# Patient Record
Sex: MALE | Race: AMERICAN INDIAN OR ALASKA NATIVE
[De-identification: names, ages, dates, MRNs, and addresses within clinical notes are randomized per-mention and may not be internally consistent; named-entity substitution may affect disease eponyms.]

---

## 2020-08-14 ENCOUNTER — HOSPITAL ENCOUNTER (INPATIENT)
Dept: HOSPITAL 5 - ED | Age: 50
LOS: 2 days | Discharge: HOME | DRG: 66 | End: 2020-08-16
Attending: INTERNAL MEDICINE | Admitting: INTERNAL MEDICINE
Payer: COMMERCIAL

## 2020-08-14 DIAGNOSIS — E78.5: ICD-10-CM

## 2020-08-14 DIAGNOSIS — Z82.49: ICD-10-CM

## 2020-08-14 DIAGNOSIS — R20.0: ICD-10-CM

## 2020-08-14 DIAGNOSIS — I16.0: ICD-10-CM

## 2020-08-14 DIAGNOSIS — R29.701: ICD-10-CM

## 2020-08-14 DIAGNOSIS — I10: ICD-10-CM

## 2020-08-14 DIAGNOSIS — I63.9: Primary | ICD-10-CM

## 2020-08-14 LAB
APTT BLD: 25.9 SEC. (ref 24.2–36.6)
BASOPHILS # (AUTO): 0 K/MM3 (ref 0–0.1)
BASOPHILS NFR BLD AUTO: 0.8 % (ref 0–1.8)
BUN SERPL-MCNC: 12 MG/DL (ref 9–20)
BUN/CREAT SERPL: 10 %
CALCIUM SERPL-MCNC: 9.2 MG/DL (ref 8.4–10.2)
EOSINOPHIL # BLD AUTO: 0 K/MM3 (ref 0–0.4)
EOSINOPHIL NFR BLD AUTO: 1.1 % (ref 0–4.3)
HCT VFR BLD CALC: 39.6 % (ref 35.5–45.6)
HEMOLYSIS INDEX: 7
HGB BLD-MCNC: 13.4 GM/DL (ref 11.8–15.2)
INR PPP: 1.07 (ref 0.87–1.13)
LYMPHOCYTES # BLD AUTO: 1.6 K/MM3 (ref 1.2–5.4)
LYMPHOCYTES NFR BLD AUTO: 38.8 % (ref 13.4–35)
MCHC RBC AUTO-ENTMCNC: 34 % (ref 32–34)
MCV RBC AUTO: 91 FL (ref 84–94)
MONOCYTES # (AUTO): 0.4 K/MM3 (ref 0–0.8)
MONOCYTES % (AUTO): 9 % (ref 0–7.3)
PLATELET # BLD: 151 K/MM3 (ref 140–440)
RBC # BLD AUTO: 4.34 M/MM3 (ref 3.65–5.03)

## 2020-08-14 PROCEDURE — 36415 COLL VENOUS BLD VENIPUNCTURE: CPT

## 2020-08-14 PROCEDURE — 85730 THROMBOPLASTIN TIME PARTIAL: CPT

## 2020-08-14 PROCEDURE — 84484 ASSAY OF TROPONIN QUANT: CPT

## 2020-08-14 PROCEDURE — 82962 GLUCOSE BLOOD TEST: CPT

## 2020-08-14 PROCEDURE — 80053 COMPREHEN METABOLIC PANEL: CPT

## 2020-08-14 PROCEDURE — 93005 ELECTROCARDIOGRAM TRACING: CPT

## 2020-08-14 PROCEDURE — 70450 CT HEAD/BRAIN W/O DYE: CPT

## 2020-08-14 PROCEDURE — 70551 MRI BRAIN STEM W/O DYE: CPT

## 2020-08-14 PROCEDURE — 85670 THROMBIN TIME PLASMA: CPT

## 2020-08-14 PROCEDURE — 80061 LIPID PANEL: CPT

## 2020-08-14 PROCEDURE — 85025 COMPLETE CBC W/AUTO DIFF WBC: CPT

## 2020-08-14 PROCEDURE — 93306 TTE W/DOPPLER COMPLETE: CPT

## 2020-08-14 PROCEDURE — 85610 PROTHROMBIN TIME: CPT

## 2020-08-14 PROCEDURE — 83036 HEMOGLOBIN GLYCOSYLATED A1C: CPT

## 2020-08-14 PROCEDURE — 80048 BASIC METABOLIC PNL TOTAL CA: CPT

## 2020-08-14 PROCEDURE — G0378 HOSPITAL OBSERVATION PER HR: HCPCS

## 2020-08-14 NOTE — CONSULTATION
History of Present Illness


Consult date: 08/14/20





Medications and Allergies


                                    Allergies











Allergy/AdvReac Type Severity Reaction Status Date / Time


 


No Known Allergies Allergy   Unverified 08/14/20 17:22














Physical Examination





- Vital Signs


Vital Signs: 


                                   Vital Signs











Temp Pulse Resp BP Pulse Ox


 


 98.4 F   97 H  18   204/117   99 


 


 08/14/20 17:22  08/14/20 17:22  08/14/20 17:22  08/14/20 17:22  08/14/20 17:22














Results





- Laboratory Findings


CBC and BMP: 


                                 08/14/20 17:50





Abnormal Lab Findings: 


                                  Abnormal Labs











  08/14/20 08/14/20





  17:36 17:50


 


WBC   4.2 L


 


RDW   13.0 L


 


Lymph % (Auto)   38.8 H


 


Mono % (Auto)   9.0 H


 


POC Glucose  115 H 














Assessment and Plan





[facility]


[facility]








Date of Service:   08/14/2020 17:26:35





Impression:


      stroke





Comments/Sign-Out:


50 year old male patient right limb numbness since last evening. 





NIHSS 0.


 BPs were 200+/100 


No indication for IVtPA, 


Ok for routine stroke admission, 


no indication for IVtPA or stat interventional assessment. 


BPs less than 180/90 


gentle IV hydration NS IVF 60mls/h 


Asa, statin





Metrics:


Last Known Well: Unknown


[facility] Notification Time: 08/14/2020 17:26:35


Arrival Time: 08/14/2020 17:26:00


Stamp Time: 08/14/2020 17:26:35


Time First Login Attempt: 08/14/2020 17:27:39


Video Start Time: 08/14/2020 17:27:39





Symptoms: right leg numbness progressing to arm since last evening


NIHSS Start Assessment Time: 08/14/2020 17:36:58


Patient is not a candidate for tPA.


Video End Time: 08/14/2020 17:50:17





CT head showed no acute hemorrhage or acute core infarct.





Clinical Presentation is not Suggestive of Large Vessel Occlusive Disease





Radiologist was not called back for review of advanced imaging because CTa not 

obtained


ED Physician notified of diagnostic impression and management plan on 08/14/2020

18:09:19





Sign Out:


      Discussed with Emergency Department Provider











------------------------------------------------------------------------------





History of Present Illness:


Patient is a 50 year old Male.





50 year old male patient who noted right leg numbness yesterday, at some point 

progressing to the right arm . Symptoms were still present today so he was 

brought here after going to urgent care. He denies limb weakness, denies vision 

changes. NIHSS 0


 





Examination:


BP(207/117),


1A: Level of Consciousness - Alert; keenly responsive + 0


1B: Ask Month and Age - Both Questions Right + 0


1C: Blink Eyes & Squeeze Hands - Performs Both Tasks + 0


2: Test Horizontal Extraocular Movements - Normal + 0


3: Test Visual Fields - No Visual Loss + 0


4: Test Facial Palsy (Use Grimace if Obtunded) - Normal symmetry + 0


5A: Test Left Arm Motor Drift - No Drift for 10 Seconds + 0


5B: Test Right Arm Motor Drift - No Drift for 10 Seconds + 0


6A: Test Left Leg Motor Drift - No Drift for 5 Seconds + 0


6B: Test Right Leg Motor Drift - No Drift for 5 Seconds + 0


7: Test Limb Ataxia (FNF/Heel-Shin) - No Ataxia + 0


8: Test Sensation - Normal; No sensory loss + 0


9: Test Language/Aphasia - Normal; No aphasia + 0


10: Test Dysarthria - Normal + 0


11: Test Extinction/Inattention - No abnormality + 0





NIHSS Score: 0





Patient/Family was informed the Neurology Consult would happen via TeleHealth 

consult by way of interactive audio and video telecommunications and consented 

to receiving care in this manner.





Due to the immediate potential for life-threatening deterioration due to 

underlying acute neurologic illness, I spent 35 minutes providing critical care.

This time includes time for face to face visit via telemedicine, review of 

medical records, imaging studies and discussion of findings with providers, the 

patient and/or family.








Dr Harsha Solis








[facility]


[phone]





Case [id number]

## 2020-08-14 NOTE — EMERGENCY DEPARTMENT REPORT
ED Neuro Deficit HPI





- General


Chief Complaint: Neuro Symptoms/Deficit


Stated Complaint: RT SIDE NUMB


Time Seen by Provider: 08/14/20 17:39


Source: patient


Mode of arrival: Ambulatory


Limitations: No Limitations





- History of Present Illness


Initial Comments: 





This is a 50-year-old male with history of hypertension who presents with right 

face right arm right leg numbness since 3:00 this morning.  Numbness began in 

the right foot then progressed to the right leg right arm and face throughout 

the day.  Numbness is still present.  He currently denies headache.  He denies 

weakness.  He denies blurry vision.


-: Sudden, This morning


Location: right face, right arm, right leg


History of same: No


Place: home


Severity: mild


Improves With: none


Worsens With: none


On Anticoagulants: No


Context: sudden onset


Associated Symptoms: denies other symptoms





- Related Data


Allergies/Adverse Reactions: 


                                    Allergies











Allergy/AdvReac Type Severity Reaction Status Date / Time


 


No Known Allergies Allergy   Unverified 08/14/20 17:22














ED Review of Systems


ROS: 


Stated complaint: RT SIDE NUMB


Other details as noted in HPI





Comment: All other systems reviewed and negative


Constitutional: denies: fever, malaise


Respiratory: denies: cough, shortness of breath


Cardiovascular: denies: chest pain


Gastrointestinal: denies: nausea, vomiting


Neurological: numbness.  denies: headache, paresthesias, confusion, abnormal 

gait, vertigo





ED Past Medical Hx





- Past Medical History


Previous Medical History?: Yes


Hx Hypertension: Yes





- Surgical History


Past Surgical History?: No





- Family History


Family history: hypertension





- Social History


Smoking Status: Never Smoker


Substance Use Type: Alcohol





ED Neuro Physical Exam





- General


Limitations: No Limitations


General appearance: alert, in no apparent distress


Suspected Stroke: Yes





- Head


Head exam: Present: atraumatic, normocephalic





- Eye


Eye exam: Present: normal appearance





- ENT


ENT exam: Present: mucous membranes moist





- Neck


Neck exam: Present: normal inspection, full ROM





- Respiratory


Respiratory exam: Present: normal lung sounds bilaterally.  Absent: respiratory 

distress, wheezes, rales, rhonchi





- Cardiovascular


Cardiovascular Exam: Present: regular rate, normal rhythm, normal heart sounds. 

Absent: systolic murmur, diastolic murmur, rubs, gallop





- GI/Abdominal


GI/Abdominal exam: Present: soft, normal bowel sounds.  Absent: distended, 

tenderness, guarding, rebound





- Rectal


Rectal exam: Present: deferred





- Extremities Exam


Extremities exam: Present: normal inspection





- Back Exam


Back exam: Present: normal inspection





- Neurological Exam


Neurological exam: Present: alert, oriented X3





- NIHSS


Assessment Interval: Baseline


1a. Level of Consciousness: alert/keenly responsive


1b. LOC Questions: answers both correctly


1c. LOC Commands: performs tasks correctly


2. Best Gaze: normal


3. Visual: no visual loss


4. Facial Palsy: normal symmetrical movement


5b. Motor Arm Right: no drift


5a. Motor Arm Left: no drift


6a. Motor Leg Left: no drift


6b. Motor Leg Right: no drift


7. Limb Ataxia: absent


8. Sensory: mild/moderate sensory loss


9. Best Language: no aphasia


10. Dysarthria: normal


11. Extinction/Inattention: no abnormality


Total Score: 1


Stroke Severity: Minor Stroke





- Psychiatric


Psychiatric exam: Present: normal affect, normal mood





- Skin


Skin exam: Present: warm, dry, intact, normal color.  Absent: rash





ED Course


                                   Vital Signs











  08/14/20





  17:22


 


Temperature 98.4 F


 


Pulse Rate 97 H


 


Respiratory 18





Rate 


 


Blood Pressure 204/117





[Right] 


 


O2 Sat by Pulse 99





Oximetry 














- Lab Data


Result diagrams: 


                                 08/14/20 17:50





                                 08/14/20 17:50


                                   Lab Results











  08/14/20 08/14/20 08/14/20 Range/Units





  17:36 17:50 17:50 


 


WBC   4.2 L   (4.5-11.0)  K/mm3


 


RBC   4.34   (3.65-5.03)  M/mm3


 


Hgb   13.4   (11.8-15.2)  gm/dl


 


Hct   39.6   (35.5-45.6)  %


 


MCV   91   (84-94)  fl


 


MCH   31   (28-32)  pg


 


MCHC   34   (32-34)  %


 


RDW   13.0 L   (13.2-15.2)  %


 


Plt Count   151   (140-440)  K/mm3


 


Lymph % (Auto)   38.8 H   (13.4-35.0)  %


 


Mono % (Auto)   9.0 H   (0.0-7.3)  %


 


Eos % (Auto)   1.1   (0.0-4.3)  %


 


Baso % (Auto)   0.8   (0.0-1.8)  %


 


Lymph #   1.6   (1.2-5.4)  K/mm3


 


Mono #   0.4   (0.0-0.8)  K/mm3


 


Eos #   0.0   (0.0-0.4)  K/mm3


 


Baso #   0.0   (0.0-0.1)  K/mm3


 


Seg Neutrophils %   50.3   (40.0-70.0)  %


 


Seg Neutrophils #   2.1   (1.8-7.7)  K/mm3


 


PT    14.0  (12.2-14.9)  Sec.


 


INR    1.07  (0.87-1.13)  


 


APTT    25.9  (24.2-36.6)  Sec.


 


Thrombin Time    17.1  (15.1-19.6)  Sec.


 


Sodium     (137-145)  mmol/L


 


Potassium     (3.6-5.0)  mmol/L


 


Chloride     ()  mmol/L


 


Carbon Dioxide     (22-30)  mmol/L


 


Anion Gap     mmol/L


 


BUN     (9-20)  mg/dL


 


Creatinine     (0.8-1.3)  mg/dL


 


Estimated GFR     ml/min


 


BUN/Creatinine Ratio     %


 


Glucose     ()  mg/dL


 


POC Glucose  115 H    ()  


 


Calcium     (8.4-10.2)  mg/dL


 


Troponin T     (0.00-0.029)  ng/mL














  08/14/20 08/14/20 Range/Units





  17:50 18:39 


 


WBC    (4.5-11.0)  K/mm3


 


RBC    (3.65-5.03)  M/mm3


 


Hgb    (11.8-15.2)  gm/dl


 


Hct    (35.5-45.6)  %


 


MCV    (84-94)  fl


 


MCH    (28-32)  pg


 


MCHC    (32-34)  %


 


RDW    (13.2-15.2)  %


 


Plt Count    (140-440)  K/mm3


 


Lymph % (Auto)    (13.4-35.0)  %


 


Mono % (Auto)    (0.0-7.3)  %


 


Eos % (Auto)    (0.0-4.3)  %


 


Baso % (Auto)    (0.0-1.8)  %


 


Lymph #    (1.2-5.4)  K/mm3


 


Mono #    (0.0-0.8)  K/mm3


 


Eos #    (0.0-0.4)  K/mm3


 


Baso #    (0.0-0.1)  K/mm3


 


Seg Neutrophils %    (40.0-70.0)  %


 


Seg Neutrophils #    (1.8-7.7)  K/mm3


 


PT    (12.2-14.9)  Sec.


 


INR    (0.87-1.13)  


 


APTT    (24.2-36.6)  Sec.


 


Thrombin Time    (15.1-19.6)  Sec.


 


Sodium  143   (137-145)  mmol/L


 


Potassium  3.7   (3.6-5.0)  mmol/L


 


Chloride  105.8   ()  mmol/L


 


Carbon Dioxide  26   (22-30)  mmol/L


 


Anion Gap  15   mmol/L


 


BUN  12   (9-20)  mg/dL


 


Creatinine  1.2   (0.8-1.3)  mg/dL


 


Estimated GFR  > 60   ml/min


 


BUN/Creatinine Ratio  10   %


 


Glucose  139 H   ()  mg/dL


 


POC Glucose   122 H  ()  


 


Calcium  9.2   (8.4-10.2)  mg/dL


 


Troponin T  < 0.010   (0.00-0.029)  ng/mL














08/14/20 19:00


EKG obtained 1852


Normal sinus rhythm rate 80 bpm normal axis normal intervals no ST elevation 

positive LVH nonischemic T wave pattern





- Radiology Data


Radiology results: report reviewed





NONENHANCED CT SCAN OF THE BRAIN: 





INDICATION: 


neuro deficits <6hrs or sx present upon awakening. 





TECHNIQUE: Routine CT head without contrast. Sagittal and coronal reformatted 

images were obtained.


 All CT scans at this location are performed using CT dose reduction for ALARA 

by means of automated 


 exposure control. 





COMPARISON: 


None. 





FINDINGS: 





BRAIN / INTRACRANIAL CONTENTS: 





Hemorrhage:No intracranial hemorrhage; no subarachnoid hemorrhage 





Stroke mimics: No subdural or epidural hematoma or space taking lesion 





Acute/subacute territorial infarction: 


Gray-white matter interface: No blurring; normal 


Insular cortex: Normal 


Basal ganglia: Normal 


Wedge shaped parenchymal low density area: Not present 


Cortical sulci: Not effaced 





Lacunar infarctions: Lacunae seen in both basal ganglia but these appear to be 

chronic (attenuation


 similar to that of CSF) 





Vasculopathy: 


Dense middle cerebral artery sign: Not present 


Internal carotid artery terminus: Normal 


Basilar artery:Normal 


Middle cerebral artery branches in the sylvian fissure (Dot sign): Normal 


Calcified embolus: Not present 





ASPECT score: 10 





Chronic lesions: Encephalomalacia is seen in the right superior frontal gyrus. 

Small chronic 


 lacunae are seen in the basal ganglia bilaterally. 





Craniocervical junction:No significant abnormality 


Orbits:No significant abnormality 


Paranasal sinuses/mastoids:No significant abnormality 





Additional findings: None 





IMPRESSION: 





No intracerebral hemorrhage 





No stroke mimics 





No acute/subacute infarction: Given the history of right arm and leg weakness, 

left precentral 


 gyrus and left corticospinal tract appear normal. 





- Medical Decision Making





Acute CVA: Patient has persistent right-sided numbness to both soft touch and 

pinprick.  NIH stroke score 1.  Due to mild symptoms, thrombolytics not 

indicated.  Also due to time of onset, thrombolytics are not indicated.  Patient

 given aspirin in the emergency department.  Tele-neurologist recommended 

admission for stroke evaluation.  I have addressed elevated blood pressure with 

IV labetalol.





\Patient is admitted to the hospital service





Work-up notable for encephalomalacia right superior frontal gyrus, lacunar 

infarcts chronic in both basal ganglia





- Thrombolytic Inclusion/Exclusion


Thrombolytic Exclusion Criteria: Symptom Onset > 3 Hours


Thrombolytic Contraindications: Rapidily Improving s/s


Critical Care Time: Yes


Critical care time in (mins) excluding proc time.: 40


Critical care attestation.: 


If time is entered above; I have spent that time in minutes in the direct care 

of this critically ill patient, excluding procedure time.


40 minutes of critical care time excluding procedures were used in the care of 

the patient. I reviewed electronic record.  I discussed treatment plan with the 

nursing team members at the bedside.  I came immediately to the bedside upon 

patient's return to the treatment room..  I Patient required multiple 

interventions and reassessments.  I spoke with multiple consultants including 

tele-neurologist, radiologist and hospitalist regarding patient's care.  Upon 

patient's arrival, code stroke initiated.





ED Disposition


Clinical Impression: 


 Acute CVA (cerebrovascular accident), Hypertensive urgency





Disposition: DC-09 OP ADMIT IP TO THIS HOSP


Is pt being admited?: Yes


Does the pt Need Aspirin: No


Condition: Stable


Referrals: 


PRIMARY CARE,MD [Primary Care Provider] - 3-5 Days

## 2020-08-14 NOTE — CAT SCAN REPORT
NONENHANCED CT SCAN OF THE BRAIN:



INDICATION:

neuro deficits <6hrs or sx present upon awakening.



TECHNIQUE: Routine CT head without contrast. Sagittal and coronal reformatted images were obtained. A
ll CT scans at this location are performed using CT dose reduction for ALARA by means of automated ex
posure control.



COMPARISON: 

None.



FINDINGS:



BRAIN / INTRACRANIAL CONTENTS:



Hemorrhage:No intracranial hemorrhage; no subarachnoid hemorrhage



Stroke mimics: No subdural or epidural hematoma or space taking lesion



Acute/subacute territorial infarction:

   Gray-white matter interface: No blurring; normal

          Insular cortex: Normal

          Basal ganglia: Normal

   Wedge shaped parenchymal low density area: Not present

   Cortical sulci: Not effaced



Lacunar infarctions: Lacunae seen in both basal ganglia but these appear to be chronic (attenuation s
imilar to that of CSF)



Vasculopathy:

   Dense middle cerebral artery sign: Not present

   Internal carotid artery terminus: Normal

   Basilar artery:Normal

   Middle cerebral artery branches in the sylvian fissure (Dot sign): Normal

   Calcified embolus: Not present



ASPECT score: 10



Chronic lesions: Encephalomalacia is seen in the right superior frontal gyrus. Small chronic lacunae 
are seen in the basal ganglia bilaterally.



Craniocervical junction:No significant abnormality

Orbits:No significant abnormality

Paranasal sinuses/mastoids:No significant abnormality



Additional findings: None



IMPRESSION:



No intracerebral hemorrhage



No stroke mimics



No acute/subacute infarction: Given the history of right arm and leg weakness, left precentral gyrus 
and left corticospinal tract appear normal.





This exam was performed as part of a code stroke protocol. The exam was completed at [facility] on 8/14/2020 4:39 PM. The exam was reviewed at 4:43 PM Central daylight saving time 
and ER physician was notified at 4:47 PM.









Signer Name: Arden Coronado MD 

Signed: 8/14/2020 5:48 PM

Workstation Name: [id number]

## 2020-08-15 RX ADMIN — HYDRALAZINE HYDROCHLORIDE PRN MG: 20 INJECTION INTRAMUSCULAR; INTRAVENOUS at 12:21

## 2020-08-15 RX ADMIN — ASPIRIN SCH MG: 325 TABLET ORAL at 11:00

## 2020-08-15 RX ADMIN — FAMOTIDINE SCH MG: 20 TABLET ORAL at 21:26

## 2020-08-15 RX ADMIN — Medication SCH ML: at 11:02

## 2020-08-15 RX ADMIN — Medication SCH ML: at 02:43

## 2020-08-15 RX ADMIN — FAMOTIDINE SCH MG: 20 TABLET ORAL at 11:00

## 2020-08-15 RX ADMIN — Medication SCH ML: at 21:28

## 2020-08-15 NOTE — PROGRESS NOTE
Assessment and Plan





- Patient Problems


(1) Acute CVA (cerebrovascular accident)


Current Visit: Yes   Status: Acute   





(2) Hypertensive urgency


Current Visit: Yes   Status: Acute   





Subjective


Date of service: 08/15/20





Objective





- Constitutional


Vitals: 


                               Vital Signs - 12hr











  08/15/20 08/15/20 08/15/20





  12:14 17:07 20:44


 


Temperature 98.4 F 98.3 F 98.4 F


 


Pulse Rate 79 97 H 81


 


Respiratory 18 18 18





Rate   


 


Blood Pressure 174/113 166/87 147/97


 


O2 Sat by Pulse 97 99 100





Oximetry   











General appearance: Present: no acute distress, well-nourished





- EENT


Eyes: PERRL, EOM intact


ENT: hearing intact, clear oral mucosa


Ears: bilateral: normal





- Neck


Neck: supple, normal ROM





- Respiratory


Respiratory effort: normal


Respiratory: bilateral: CTA





- Breasts


Breasts: normal





- Cardiovascular


Rhythm: regular


Heart Sounds: Present: S1 & S2.  Absent: gallop, rub


Extremities: pulses intact, No edema, normal color, Full ROM





- Gastrointestinal


General gastrointestinal: Present: soft, non-tender, non-distended, normal bowel

sounds





- Genitourinary


Male genitourinary: normal





- Integumentary


Integumentary: clear, warm, dry





- Musculoskeletal


Musculoskeletal: 1, strength equal bilaterally





- Neurologic


Neurologic: moves all extremities





- Psychiatric


Psychiatric: memory intact, appropriate mood/affect, intact judgment & insight





- Labs


CBC & Chem 7: 


                                 08/14/20 17:50





                                 08/14/20 17:50


Labs: 


IMPRESSION: 1. There is a 1 cm acute infarct oriented along the posterior limb 

of the left internal capsule as detailed above. 2. There is otherwise moderate 

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 





HEART Score





- HEART Score


Troponin: 


                                        











Troponin T  < 0.010 ng/mL (0.00-0.029)   08/14/20  17:50

## 2020-08-15 NOTE — MAGNETIC RESONANCE REPORT
MR brain wo con



INDICATION / CLINICAL INFORMATION:

50 years Male; MAIN. 



TECHNIQUE: 

Multiplanar, multisequence MR images of the brain were obtained. 



COMPARISON: 

The study is compared to the previous CT of 8/14/2020.



FINDINGS:



BRAIN / INTRACRANIAL CONTENTS: There is an acute infarct oriented along the fissure limb of the left 
internal capsule measuring 1.0 cm longitudinally. On the FLAIR sequence, there are otherwise scattere
d hyperintense foci involving cerebral white matter most consistent with microvascular angiopathy. Th
ere are old small lacunar infarcts involving the basal ganglia bilaterally. Chronic ischemic changes 
also extend to the frontal cortical region. The diffusion imaging reveals no further evidence of acut
e infarction.



On the susceptibility weighted imaging, there are a few foci of marked decreased signal intensity inc
luding the basal ganglia most consistent with chronic microhemorrhages. There is mild cerebral atroph
y. The ventricular system is correspondingly appropriate in size and configuration. No extra-axial fl
uid collections or significant mass effect is identified. The above microvascular angiopathic changes
 would be advanced for the patient's age.



CRANIOCERVICAL JUNCTION: No significant abnormality.

VASCULAR FLOW-VOIDS: The distal internal carotid arteries and vertebrobasilar system also demonstrate
 appropriate signal voids.



ORBITS: No significant abnormality of visualized orbits.

SINUSES / MASTOIDS: There are mild focal inflammatory changes along the posterior left maxillary sinu
s.



ADDITIONAL FINDINGS: None. 



IMPRESSION:

1. There is a 1 cm acute infarct oriented along the posterior limb of the left internal capsule as de
tailed above.

2. 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



Signer Name: Siddharth Skinner MD 

Signed: 8/15/2020 11:04 AM

Workstation Name: [id number]

## 2020-08-15 NOTE — HISTORY AND PHYSICAL REPORT
History of Present Illness


Date of examination: 08/14/20


Date of admission: 


08/14/20 19:47





Chief complaint: 





Right-sided numbness since a.m.


History of present illness: 


50-year-old male with history of hypertension comes in for right-sided numbness 

since 3 AM this morning.  No seizures.  Right right-sided numbness extended from

the foot to the left and right lower extremity and then to left side of the body

and face.  Patient has problems when he tries to close his mouth








- Past Medical History


Previous Medical History?: Yes


Hx Hypertension: Yes





- Surgical History


Past Surgical History?: No





- Family History


Family history: hypertension





- Social History


Smoking Status: Never Smoker


Substance Use Type: Alcohol





Review of systems


Constitutional no weight loss or weight gain no fever or chills


HEENT no sore throat no post nasal drip no diplopia


Neck no neck stiffness no lymph gland enlargement


Chest and lungs no shortness of breath cough or wheezing


CVS no chest pain no diaphoresis no palpitations


GI no nausea no vomiting no diarrhea


Genitourinary system no dysuria no flank pain


Musculoskeletal system no muscle pains no joint pains


CNS right-sided numbness from the face to the right side of the chest to the 

right legs right lower extremity.


Skin no rash no itching


Psychiatric no depression no homicidal or suicidal tendencies


Hematologic no lymphedema or bruising


Endocrine no polydipsia no polyuria no cold intolerance no heat intolerance





Medications and Allergies


                                    Allergies











Allergy/AdvReac Type Severity Reaction Status Date / Time


 


No Known Allergies Allergy   Verified 08/15/20 00:20











                                Home Medications











 Medication  Instructions  Recorded  Confirmed  Last Taken  Type


 


No Known Home Medications [No  08/14/20 08/14/20 Unknown History





Reported Home Medications]     














Exam





- Constitutional


Vitals: 


                                        











Temp Pulse Resp BP Pulse Ox


 


 98.4 F   65   12   168/107   98 


 


 08/14/20 17:22  08/14/20 23:00  08/14/20 23:00  08/14/20 23:00  08/14/20 23:00











General appearance: Present: no acute distress, well-nourished





- EENT


Eyes: Present: PERRL


ENT: hearing intact, clear oral mucosa





- Neck


Neck: Present: supple, normal ROM





- Respiratory


Respiratory effort: normal


Respiratory: bilateral: CTA





- Cardiovascular


Heart rate: 78


Rhythm: regular


Heart Sounds: Present: S1 & S2.  Absent: rub, click





- Extremities


Extremities: no ischemia, pulses intact, pulses symmetrical, No edema


Peripheral Pulses: within normal limits





- Abdominal


General gastrointestinal: Present: soft, non-tender, non-distended, normal bowel

sounds


Male genitourinary: Present: normal





- Integumentary


Integumentary: Present: clear, warm, dry





- Musculoskeletal


Musculoskeletal: gait normal, strength equal bilaterally





- Psychiatric


Psychiatric: appropriate mood/affect, intact judgment & insight





- Neurologic


Neurologic: CNII-XII intact, moves all extremities





- Allied Health


Allied health notes reviewed: nursing, case management





HEART Score





- HEART Score


Troponin: 


                                        











Troponin T  < 0.010 ng/mL (0.00-0.029)   08/14/20  17:50    














Results





- Labs


CBC & Chem 7: 


                                 08/16/20 04:47





                                 08/16/20 04:47


Labs: 


                             Laboratory Last Values











WBC  4.2 K/mm3 (4.5-11.0)  L  08/14/20  17:50    


 


RBC  4.34 M/mm3 (3.65-5.03)   08/14/20  17:50    


 


Hgb  13.4 gm/dl (11.8-15.2)   08/14/20  17:50    


 


Hct  39.6 % (35.5-45.6)   08/14/20  17:50    


 


MCV  91 fl (84-94)   08/14/20  17:50    


 


MCH  31 pg (28-32)   08/14/20  17:50    


 


MCHC  34 % (32-34)   08/14/20  17:50    


 


RDW  13.0 % (13.2-15.2)  L  08/14/20  17:50    


 


Plt Count  151 K/mm3 (140-440)   08/14/20  17:50    


 


Lymph % (Auto)  38.8 % (13.4-35.0)  H  08/14/20  17:50    


 


Mono % (Auto)  9.0 % (0.0-7.3)  H  08/14/20  17:50    


 


Eos % (Auto)  1.1 % (0.0-4.3)   08/14/20  17:50    


 


Baso % (Auto)  0.8 % (0.0-1.8)   08/14/20  17:50    


 


Lymph #  1.6 K/mm3 (1.2-5.4)   08/14/20  17:50    


 


Mono #  0.4 K/mm3 (0.0-0.8)   08/14/20  17:50    


 


Eos #  0.0 K/mm3 (0.0-0.4)   08/14/20  17:50    


 


Baso #  0.0 K/mm3 (0.0-0.1)   08/14/20  17:50    


 


Seg Neutrophils %  50.3 % (40.0-70.0)   08/14/20  17:50    


 


Seg Neutrophils #  2.1 K/mm3 (1.8-7.7)   08/14/20  17:50    


 


PT  14.0 Sec. (12.2-14.9)   08/14/20  17:50    


 


INR  1.07  (0.87-1.13)   08/14/20  17:50    


 


APTT  25.9 Sec. (24.2-36.6)   08/14/20  17:50    


 


Thrombin Time  17.1 Sec. (15.1-19.6)   08/14/20  17:50    


 


Sodium  143 mmol/L (137-145)   08/14/20  17:50    


 


Potassium  3.7 mmol/L (3.6-5.0)   08/14/20  17:50    


 


Chloride  105.8 mmol/L ()   08/14/20  17:50    


 


Carbon Dioxide  26 mmol/L (22-30)   08/14/20  17:50    


 


Anion Gap  15 mmol/L  08/14/20  17:50    


 


BUN  12 mg/dL (9-20)   08/14/20  17:50    


 


Creatinine  1.2 mg/dL (0.8-1.3)   08/14/20  17:50    


 


Estimated GFR  > 60 ml/min  08/14/20  17:50    


 


BUN/Creatinine Ratio  10 %  08/14/20  17:50    


 


Glucose  139 mg/dL ()  H  08/14/20  17:50    


 


POC Glucose  122  ()  H  08/14/20  18:39    


 


Calcium  9.2 mg/dL (8.4-10.2)   08/14/20  17:50    


 


Troponin T  < 0.010 ng/mL (0.00-0.029)   08/14/20  17:50    








                                    Short CBC











  08/16/20 Range/Units





  04:47 


 


WBC  3.7 L  (4.5-11.0)  K/mm3


 


Hgb  14.2  (11.8-15.2)  gm/dl


 


Hct  42.1  (35.5-45.6)  %


 


Plt Count  159  (140-440)  K/mm3








                                       BMP











  08/16/20





  04:47


 


Sodium  138


 


Potassium  3.5 L


 


Chloride  101.7


 


Carbon Dioxide  27


 


BUN  15


 


Creatinine  1.1


 


Glucose  105 H


 


Calcium  9.2








                                 Liver Function











  08/16/20 Range/Units





  04:47 


 


Total Bilirubin  0.50  (0.1-1.2)  mg/dL


 


AST  19  (5-40)  units/L


 


ALT  23  (7-56)  units/L


 


Alkaline Phosphatase  46  ()  units/L


 


Albumin  4.0  (3.9-5)  g/dL














- Imaging and Cardiology


EKG: report reviewed


Chest x-ray: report reviewed (If people like you do not go for putting the end 

of)


Imaging and Cardiology: 


MRI w





IMPRESSION: 1. There is a 1 cm acute infarct oriented along the posterior limb 

of the left internal capsule as detailed above. 2. There is otherwise moderate 

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


Cheema/IV: 


                                        





Voiding Method                   Urinal


IV Catheter Type [Right          INT / Saline Lock


Antecubital]                     











Assessment and Plan


Advance Directives: Yes





- Patient Problems


(1) Acute CVA (cerebrovascular accident)


Current Visit: Yes   Status: Acute   


Plan to address problem: 


Acute infarct continue left internal capsule area affecting the sensory region 

patient has sensory stroke


Aspirin and Plavix











(2) Hypertensive urgency


Current Visit: Yes   Status: Acute   


Plan to address problem: 


Patient initiated on antihypertensives and IV hydralazine PRN








(3) DVT prophylaxis


Current Visit: Yes   Status: Acute   


Plan to address problem: 


On Lovenox and GI prophylaxis

## 2020-08-16 VITALS — SYSTOLIC BLOOD PRESSURE: 147 MMHG | DIASTOLIC BLOOD PRESSURE: 95 MMHG

## 2020-08-16 LAB
ALBUMIN SERPL-MCNC: 4 G/DL (ref 3.9–5)
ALT SERPL-CCNC: 23 UNITS/L (ref 7–56)
BASOPHILS # (AUTO): 0 K/MM3 (ref 0–0.1)
BASOPHILS NFR BLD AUTO: 0.6 % (ref 0–1.8)
BUN SERPL-MCNC: 15 MG/DL (ref 9–20)
BUN/CREAT SERPL: 14 %
CALCIUM SERPL-MCNC: 9.2 MG/DL (ref 8.4–10.2)
EOSINOPHIL # BLD AUTO: 0.1 K/MM3 (ref 0–0.4)
EOSINOPHIL NFR BLD AUTO: 2.6 % (ref 0–4.3)
HCT VFR BLD CALC: 42.1 % (ref 35.5–45.6)
HDLC SERPL-MCNC: 65 MG/DL (ref 40–59)
HEMOLYSIS INDEX: 18
HGB BLD-MCNC: 14.2 GM/DL (ref 11.8–15.2)
LYMPHOCYTES # BLD AUTO: 1.7 K/MM3 (ref 1.2–5.4)
LYMPHOCYTES NFR BLD AUTO: 45.8 % (ref 13.4–35)
MCHC RBC AUTO-ENTMCNC: 34 % (ref 32–34)
MCV RBC AUTO: 92 FL (ref 84–94)
MONOCYTES # (AUTO): 0.4 K/MM3 (ref 0–0.8)
MONOCYTES % (AUTO): 10.7 % (ref 0–7.3)
PLATELET # BLD: 159 K/MM3 (ref 140–440)
RBC # BLD AUTO: 4.59 M/MM3 (ref 3.65–5.03)

## 2020-08-16 RX ADMIN — ASPIRIN SCH MG: 325 TABLET ORAL at 10:36

## 2020-08-16 RX ADMIN — FAMOTIDINE SCH MG: 20 TABLET ORAL at 21:12

## 2020-08-16 RX ADMIN — FAMOTIDINE SCH MG: 20 TABLET ORAL at 10:36

## 2020-08-16 RX ADMIN — HYDRALAZINE HYDROCHLORIDE PRN MG: 20 INJECTION INTRAMUSCULAR; INTRAVENOUS at 20:31

## 2020-08-16 RX ADMIN — Medication SCH ML: at 10:36

## 2020-08-16 RX ADMIN — Medication SCH ML: at 21:14

## 2020-08-16 NOTE — PROGRESS NOTE
Assessment and Plan





- Patient Problems


(1) Acute CVA (cerebrovascular accident)


Current Visit: Yes   Status: Acute   


Plan to address problem: 


Acute infarct continue left internal capsule area affecting the sensory region 

patient has sensory stroke


Aspirin and Plavix











(2) Hypertensive urgency


Current Visit: Yes   Status: Acute   


Plan to address problem: 


Patient initiated on antihypertensives and IV hydralazine PRN








(3) DVT prophylaxis


Current Visit: Yes   Status: Acute   


Plan to address problem: 


On Lovenox and GI prophylaxis








Subjective


Date of service: 08/15/20


Principal diagnosis: Acute CVA


Interval history: 


Patient has right-sided numbness from face to the toes which is improving 

patient's clinical picture consistent with acute sensory stroke involving the 

possibly thalamus and internal capsule











Objective





- Constitutional


General appearance: Present: no acute distress, well-nourished





- EENT


Eyes: PERRL, EOM intact


ENT: hearing intact, clear oral mucosa


Ears: bilateral: normal





- Neck


Neck: supple, normal ROM





- Respiratory


Respiratory effort: normal


Respiratory: bilateral: CTA





- Breasts


Breasts: normal





- Cardiovascular


Heart rate: 78


Rhythm: regular


Heart Sounds: Present: S1 & S2.  Absent: gallop, rub


Extremities: no ischemia, pulses intact, No edema, normal color, Full ROM





- Gastrointestinal


General gastrointestinal: Present: soft, non-tender, non-distended, normal bowel

sounds





- Genitourinary


Male genitourinary: deferred, normal





- Integumentary


Integumentary: clear, warm, dry





- Musculoskeletal


Musculoskeletal: 1, strength equal bilaterally





- Neurologic


Neurologic: moves all extremities





- Psychiatric


Psychiatric: memory intact, appropriate mood/affect, intact judgment & insight





- Labs


CBC & Chem 7: 


                                 08/16/20 04:47





                                 08/16/20 04:47


Labs: 


                              Abnormal lab results











  08/16/20 08/16/20 Range/Units





  04:47 04:47 


 


WBC  3.7 L   (4.5-11.0)  K/mm3


 


RDW  13.1 L   (13.2-15.2)  %


 


Lymph % (Auto)  45.8 H   (13.4-35.0)  %


 


Mono % (Auto)  10.7 H   (0.0-7.3)  %


 


Seg Neutrophils #  1.5 L   (1.8-7.7)  K/mm3


 


Potassium   3.5 L  (3.6-5.0)  mmol/L


 


Glucose   105 H  ()  mg/dL


 


Cholesterol   200 H  ()  mg/dL


 


HDL Cholesterol   65 H  (40-59)  mg/dL














HEART Score





- HEART Score


Troponin: 


                                        











Troponin T  < 0.010 ng/mL (0.00-0.029)   08/14/20  17:50

## 2020-08-16 NOTE — DISCHARGE SUMMARY
Providers





- Providers


Date of Admission: 


08/15/20 11:00





Date of discharge: 08/16/20


Attending physician: 


CADEN LONGORIA





                                        





08/15/20 00:14


Occupational Therapy Evaluate and Treat [CONS] Routine 


   Comment: 


   Reason For Exam: Neuro deficits


Physical Therapy Evaluation and Treat [CONS] Routine 


   Comment: 


   Reason For Exam: Neuro deficits











Primary care physician: 


PRIMARY CARE MD








Hospitalization


Condition: Stable


Pertinent studies: 


Patient had MRI and echocardiogram and carotid duplex scan





Hospital course: 


Subjective


Date of service: 08/16/20


Principal diagnosis: Acute CVA


Interval history: 


Patient has right-sided numbness from face to the toes which is improving 

patient's clinical picture consistent with acute sensory stroke involving the 

possibly thalamus and internal capsule














(1) Acute CVA (cerebrovascular accident)


Current Visit: Yes   Status: Acute   


Plan to address problem: 


Acute infarct continue left internal capsule area affecting the sensory region 

patient has sensory stroke


Aspirin and Plavix











(2) Hypertensive urgency 


Current Visit: Yes   Status: Acute   


Plan to address problem: 


Patient initiated on antihypertensives and IV hydralazine PRN








(3) DVT prophylaxis


Current Visit: Yes   Status: Acute   


Plan to address problem: 


On Lovenox and GI prophylaxis 





4)Dyslipidemia 


on statins  











Disposition: DC-01 TO HOME OR SELFCARE





- Discharge Diagnoses


(1) Acute CVA (cerebrovascular accident)


Status: Acute   





(2) Hypertensive urgency


Status: Acute   





(3) DVT prophylaxis


Status: Acute   





Core Measure Documentation





- Palliative Care


Palliative Care/ Comfort Measures: Not Applicable





- Core Measures


Any of the following diagnoses?: none





Exam





- Constitutional


Vitals: 


                                        











Temp Pulse Resp BP Pulse Ox


 


 98.0 F   71   20   148/88   98 


 


 08/16/20 05:22  08/16/20 05:22  08/16/20 05:22  08/16/20 05:22  08/16/20 05:22











General appearance: Present: no acute distress, well-nourished





- EENT


Eyes: Present: PERRL


ENT: hearing intact, clear oral mucosa





- Neck


Neck: Present: supple, normal ROM





- Respiratory


Respiratory effort: normal


Respiratory: bilateral: CTA





- Cardiovascular


Heart rate: 78


Rhythm: regular


Heart Sounds: Present: S1 & S2.  Absent: rub, click





- Extremities


Extremities: pulses symmetrical, No edema


Peripheral Pulses: within normal limits





- Abdominal


General gastrointestinal: Present: soft, non-tender, non-distended, normal bowel

 sounds


Male genitourinary: Present: normal





- Integumentary


Integumentary: Present: clear, warm, dry





- Musculoskeletal


Musculoskeletal: gait normal, strength equal bilaterally





- Psychiatric


Psychiatric: appropriate mood/affect, intact judgment & insight





- Neurologic


Neurologic: CNII-XII intact, moves all extremities





Plan


Diet: low fat, low cholesterol, low salt


Follow up with: 


PRIMARY CARE,MD [Primary Care Provider] - 3-5 Days


MACIEJ ZACARIAS MD [Staff Physician] - 7 Days